# Patient Record
Sex: FEMALE | Race: AMERICAN INDIAN OR ALASKA NATIVE
[De-identification: names, ages, dates, MRNs, and addresses within clinical notes are randomized per-mention and may not be internally consistent; named-entity substitution may affect disease eponyms.]

---

## 2019-03-19 ENCOUNTER — HOSPITAL ENCOUNTER (INPATIENT)
Dept: HOSPITAL 5 - APU | Age: 29
LOS: 2 days | Discharge: HOME | End: 2019-03-21
Attending: OBSTETRICS & GYNECOLOGY | Admitting: OBSTETRICS & GYNECOLOGY
Payer: MEDICAID

## 2019-03-19 DIAGNOSIS — D64.9: ICD-10-CM

## 2019-03-19 DIAGNOSIS — O34.211: Primary | ICD-10-CM

## 2019-03-19 DIAGNOSIS — D62: ICD-10-CM

## 2019-03-19 DIAGNOSIS — Z3A.39: ICD-10-CM

## 2019-03-19 LAB
BASOPHILS # (AUTO): 0 K/MM3 (ref 0–0.1)
BASOPHILS NFR BLD AUTO: 0.5 % (ref 0–1.8)
EOSINOPHIL # BLD AUTO: 0 K/MM3 (ref 0–0.4)
EOSINOPHIL NFR BLD AUTO: 0.9 % (ref 0–4.3)
HCT VFR BLD CALC: 31.9 % (ref 30.3–42.9)
HGB BLD-MCNC: 10.4 GM/DL (ref 10.1–14.3)
LYMPHOCYTES # BLD AUTO: 1.5 K/MM3 (ref 1.2–5.4)
LYMPHOCYTES NFR BLD AUTO: 38.4 % (ref 13.4–35)
MCHC RBC AUTO-ENTMCNC: 33 % (ref 30–34)
MCV RBC AUTO: 80 FL (ref 79–97)
MONOCYTES # (AUTO): 0.3 K/MM3 (ref 0–0.8)
MONOCYTES % (AUTO): 8.9 % (ref 0–7.3)
PLATELET # BLD: 227 K/MM3 (ref 140–440)
RBC # BLD AUTO: 4 M/MM3 (ref 3.65–5.03)

## 2019-03-19 PROCEDURE — 86850 RBC ANTIBODY SCREEN: CPT

## 2019-03-19 PROCEDURE — C1765 ADHESION BARRIER: HCPCS

## 2019-03-19 PROCEDURE — 0UB70ZZ EXCISION OF BILATERAL FALLOPIAN TUBES, OPEN APPROACH: ICD-10-PCS | Performed by: OBSTETRICS & GYNECOLOGY

## 2019-03-19 PROCEDURE — 88302 TISSUE EXAM BY PATHOLOGIST: CPT

## 2019-03-19 PROCEDURE — 86900 BLOOD TYPING SEROLOGIC ABO: CPT

## 2019-03-19 PROCEDURE — 86901 BLOOD TYPING SEROLOGIC RH(D): CPT

## 2019-03-19 PROCEDURE — 85014 HEMATOCRIT: CPT

## 2019-03-19 PROCEDURE — 85018 HEMOGLOBIN: CPT

## 2019-03-19 PROCEDURE — C9250 ARTISS FIBRIN SEALANT: HCPCS

## 2019-03-19 PROCEDURE — 36415 COLL VENOUS BLD VENIPUNCTURE: CPT

## 2019-03-19 PROCEDURE — 85025 COMPLETE CBC W/AUTO DIFF WBC: CPT

## 2019-03-19 RX ADMIN — HYDROMORPHONE HYDROCHLORIDE PRN MG: 1 INJECTION, SOLUTION INTRAMUSCULAR; INTRAVENOUS; SUBCUTANEOUS at 15:44

## 2019-03-19 NOTE — POST ANESTHESIA EVALUATION
- Post Anesthesia Evaluation


Airway Patent: Yes


Stable Respiratory Function: Yes


Nausea/Vomiting: No


Temp > 96.8F: Yes


Pain Manageable: Yes


Adequeate Hydration: Yes


Anesthesia Complications: No


Block Receding Appropriately: Yes


Patient on Ventilator: No

## 2019-03-19 NOTE — HISTORY AND PHYSICAL REPORT
History of Present Illness


Date of admission: 


19 09:15





History of present illness: 








29yo  at 39     / weeks MARIZA presents for scheduled  section.  She

reports good fetal movement, no loss of fluid and no vaginal bleeding.





Her prenatal care has been routine with [facility].  She was 

comanaged with [facility] due to her history of previous  section.  She also 

desires a female sterilization and has been consented for the risk of ectopic 

pregnancy and pregnancy failure. 





Past History





- Obstetrical History


Expected Date of Delivery: 19


Actual Gestation: 39 Week(s) 2 Day(s) 


: 4


Number of Living Children: 3





Medications and Allergies


                                    Allergies











Allergy/AdvReac Type Severity Reaction Status Date / Time


 


No Known Allergies Allergy   Verified 16 08:51











                                Home Medications











 Medication  Instructions  Recorded  Confirmed  Last Taken  Type


 


Ferrous Sulfate [Feosol 325 MG tab] 325 mg PO BID #60 tablet 16  Unknown 

Rx


 


HYDROcodone/APAP 5-325 [[place] 1 each PO Q6HR PRN #30 tablet 16  Unknown Rx





5/325]     


 


Ibuprofen [Motrin] 800 mg PO Q8HR PRN #30 tablet 16  Unknown Rx


 


Prenatal Vit Calc,Iron,Folic 1 each PO DAILY #30 tablet 16  Unknown Rx





[Prenatal Vitamins]     











Active Meds: 


Active Medications





Hydromorphone HCl (Dilaudid)  0.25 mg IV Q5M PRN


   PRN Reason: Breakthrough Pain


   Stop: 19 23:59


Hydromorphone HCl (Dilaudid)  0.5 mg IV ONCE PRN


   PRN Reason: breakthrough pain > 7/10


Cefazolin Sodium (Ancef/Sterile Water 2 Gm/20 Ml)  2 gm in 20 mls @ 80 mls/hr IV

PREOP NR; Protocol


   Stop: 19 23:59


Lactated Ringer's (Lactated Ringers)  1,000 mls @ 2,250 mls/hr IV PREOP PETER


   Stop: 19 13:27


Oxytocin/Sodium Chloride (Pitocin/Ns 20 Unit/1000ml Drip)  20 units in 1,000 mls

@ 0 mls/hr IV TITR PETER


Naloxone HCl (Narcan 0.4 Mg/1 Ml)  0.2 mg IV Q2MIN PRN


   PRN Reason: Res Rate </= 8 or 02 SAT < 92%


Ondansetron HCl (Zofran)  4 mg IV Q8H PRN


   PRN Reason: Nausea And Vomiting


Sodium Chloride (Sodium Chloride Flush Syringe 10 Ml)  10 ml IV PRN NR


   Stop: 19 11:59











- Vital Signs


Vital signs: 


                                   Vital Signs











Temp Pulse Resp BP


 


 98.8 F   73   16   124/68 


 


 19 11:37  19 11:37  19 11:37  19 11:37








                                        











Temp Pulse Resp BP Pulse Ox


 


 98.8 F   73   16   124/68    


 


 19 11:37  19 11:37  19 11:37  19 11:37   














- Obstetrical


FHR: category 1





Results


Result Diagrams: 


                                19 Unknown





                              Abnormal lab results











  19 Range/Units





  Unknown 


 


WBC  3.9 L  (4.5-11.0)  K/mm3


 


MCH  26 L  (28-32)  pg


 


RDW  15.3 H  (13.2-15.2)  %


 


Lymph % (Auto)  38.4 H  (13.4-35.0)  %


 


Mono % (Auto)  8.9 H  (0.0-7.3)  %








All other labs normal.








Assessment and Plan





- Patient Problems


(1) Previous  delivery affecting pregnancy


Onset Date: 16   Current Visit: No   Status: Acute   





(2) 39 weeks gestation of pregnancy


Current Visit: Yes   Status: Acute   


Plan to address problem: 





IVF


Routine labs, T & Screen


SCD


Ancef 2g IV


Risks, benefits and alternatives discussed informed consent signed for bilateral

tubal ligation and  section. 


Proceed to OR. 








(3) Anemia affecting pregnancy


Current Visit: Yes   Status: Acute

## 2019-03-19 NOTE — OPERATIVE REPORT
Operative Report


Operative Report: 











DATE OF OPERATION 3/19/19





PREOP Diagnosis


1. 39 4/7 weeks gestation


2. Previous  section


3. Desires permanent sterilization





PREOP Diagnosis


1. 39 4/7 weeks gestation


2. Previous  section


3. Desires permanent sterilization





Procedure: 


1. Repeat low-transverse  section


2. Bilateral tubal ligation via modified [place] method


3. Lysis of adhesions





Findings


1. Viable male infant in the vertex presentation  weighing 6lb 5oz, 2875g APGARS

8 at 1 min, 9 at 5 min


2. Normal bilateral ovaries and tubes


3. Extensive filmy bladder to lower uterine segment adhesions





Surgeon


1. Caitlin Osuna MD





Anesthesia:


1. Epidural 





I/O:


EBL: 600ml


UOP: 300ml, clear urine


IVF 2100ml LR





Specimens removed:


1. Placenta


2. Right and left tubal segments





Complications: none





Disposition: Patient taken to recovery room in stable condition





INDICATIONS:


The patient is a 24yo  at 39 4/7weeks that presented for scheduled repeat

 section and permanent sterilization.  The patient was consented and the

risks including but not limited to bleeding, infections, injury to surrounding 

organs, potential injury to mother/infant were discussed.  Risk of tubal 

ligation including ectopic pregnancy and pregnancy failure were discussed. All 

questions were answered and informed consent signed.  








PROCEDURE:


The patient was taken to the OR in stable condition.  Adequate anesthesia was 

achieved with epidural anesthesia. A pierson catheter was placed.  She wore SCDs 

for DVT prophylaxis.  And received Ancef for infection prophylaxis. The patient 

was prepped and draped in a sterile fashion and an additional time out was done.

A Pfannestiel incision was made over the previous uterine scar.  The fascia was 

incised and the incision extended laterally.  The  superior and inferior aspect 

of the rectus muscle was dissected off of the fascia.  Entry into the peritoneum

was achieved. The peritoneum was adhered to the lower uterine segment with filmy

adhesions.  The bladder was adhered as well. The Metzenbaum scissors was used to

dissect the peritoneum off the lower uterine segment.     A low-transverse 

incision was made made in the uterus and extended laterally.   Fetal membranes 

were ruptured.  The fetal vertex was brought to the hysterotomy.  The body was 

delivered and the infant was  bulb suctioned.  The cord was clamped x 2, cut and

infant handed off to awaiting pediatrician staff.   The placenta was delivered 

intact and 20 units of IV Pitocin were added to LR fluids.  The uterus was 

cleaned of all clots.  The uterus was repaired with 0-Vicryl in a running, 

locked stitch and an imbricating layer of the same suture was used. The lower 

uterine segment was noted to be thin and several figure of 8's were used to 

maintain hemostasis of the lower uterine segment at the level of the 

hysterotomy.  Tissell's fibrine sealant was placed over the hysterotomy and 

lower uterine segment.  





Attention was then turned to the right fallopian tube which was traced down to 

the fimbriated end.  The tube was grasped with the Phoenix scissors at the 

isthmus 3 cm distal to the cornua.  0-Chromic was used to secure a section of 

the fallopian tube and the tubal ligation was carried out using the modified 

[place] methods.  The same procedure was carried out on the left tube and both 

tubes were sent to pathology for evaluation. 





The rectus was reapproximated with 2-0  Vicryl. Interced was placed anterior to 

rectus muscle. The fascia was closed with 0 Vicryl and the skin was closed with 

4-0 Vicryl.





The patient tolerated the procedure well.  All counts were correct x 3.  Urine 

was noted to be clear at close of case.  I was present and scrubbed for the 

entire procedure.








The patient was taken to the recovery room in stable condition.

## 2019-03-20 LAB
HCT VFR BLD CALC: 26 % (ref 30.3–42.9)
HGB BLD-MCNC: 8.5 GM/DL (ref 10.1–14.3)

## 2019-03-20 RX ADMIN — FERROUS SULFATE TAB 325 MG (65 MG ELEMENTAL FE) SCH MG: 325 (65 FE) TAB at 13:37

## 2019-03-20 RX ADMIN — OXYCODONE AND ACETAMINOPHEN PRN TAB: 5; 325 TABLET ORAL at 19:53

## 2019-03-20 RX ADMIN — IBUPROFEN PRN MG: 800 TABLET, FILM COATED ORAL at 09:06

## 2019-03-20 RX ADMIN — IBUPROFEN PRN MG: 800 TABLET, FILM COATED ORAL at 02:43

## 2019-03-20 RX ADMIN — IBUPROFEN PRN MG: 800 TABLET, FILM COATED ORAL at 19:53

## 2019-03-20 RX ADMIN — OXYCODONE AND ACETAMINOPHEN PRN TAB: 5; 325 TABLET ORAL at 02:42

## 2019-03-20 RX ADMIN — SIMETHICONE PRN MG: 80 TABLET, CHEWABLE ORAL at 13:37

## 2019-03-20 RX ADMIN — OXYCODONE AND ACETAMINOPHEN PRN TAB: 5; 325 TABLET ORAL at 09:06

## 2019-03-20 RX ADMIN — IBUPROFEN PRN MG: 800 TABLET, FILM COATED ORAL at 14:42

## 2019-03-20 RX ADMIN — OXYCODONE AND ACETAMINOPHEN PRN TAB: 5; 325 TABLET ORAL at 14:41

## 2019-03-20 RX ADMIN — HYDROMORPHONE HYDROCHLORIDE PRN MG: 1 INJECTION, SOLUTION INTRAMUSCULAR; INTRAVENOUS; SUBCUTANEOUS at 00:01

## 2019-03-20 RX ADMIN — FERROUS SULFATE TAB 325 MG (65 MG ELEMENTAL FE) SCH MG: 325 (65 FE) TAB at 21:52

## 2019-03-20 NOTE — PROGRESS NOTE
Assessment and Plan


A: POD#1


     Asymptomatic Anemia








P: Follow Routine PostOp Orders


   Infed 100mg IM x 1 dose


   Ferrous Sulfate 325mg PO BID


   Advance Diet with +Flatus


   Encourage increased ambulation








Subjective





- Subjective


Date of service: 19


Patient reports: appetite normal, voiding normally, pain well controlled, 

ambulating normally


: doing well, bottle feeding





Objective





- Vital Signs


Latest vital signs: 


                                   Vital Signs











  Temp Pulse Resp BP BP Pulse Ox


 


 19 07:43  98.2 F  78  18  115/71   99


 


 19 04:00  98.6 F  66  16   102/78 


 


 19 02:43    20   


 


 19 02:42    20   


 


 19 00:24  98.6 F  87  18  126/79   100


 


 19 00:01    20   


 


 19 20:02    18   


 


 19 16:00  98.4 F  75  14  132/75   100


 


 19 15:55   70  14  136/80   100


 


 19 15:44    12   


 


 19 15:40   64  13  121/88   100


 


 19 15:25   63  13  121/80   100


 


 19 15:10   52 L  12  133/84   100


 


 19 15:05   55 L  12  136/78   100


 


 19 15:00  97.6 F  58 L  12  130/78   100


 


 19 11:37  98.8 F  73  16   124/68 








                                Intake and Output











 19





 22:59 06:59 14:59


 


Output Total 350 1400 200


 


Balance -350 -1400 -200


 


Output:   


 


  Urine 350 1400 200


 


    Indwelling Catheter  800 200


 


    Void  600 


 


Other:   


 


  Total, Output Amount  600 200


 


  # Voids   


 


    Indwelling Catheter   1


 


    Void  1 














- Exam


Breasts: Present: normal


Cardiovascular: Present: Regular rate


Lungs: Present: Clear to auscultation, Normal air movement


Abdomen: Present: normal appearance, soft, normal bowel sounds


Uterus: Present: normal, firm, fundal height below umbilicus


Extremities: Present: normal


Incision: Present: normal, dry, dressed





- Labs


Labs: 


                              Abnormal lab results











  19 Range/Units





  Unknown 03:49 


 


WBC  3.9 L   (4.5-11.0)  K/mm3


 


Hgb   8.5 L  (10.1-14.3)  gm/dl


 


Hct   26.0 L  (30.3-42.9)  %


 


MCH  26 L   (28-32)  pg


 


RDW  15.3 H   (13.2-15.2)  %


 


Lymph % (Auto)  38.4 H   (13.4-35.0)  %


 


Mono % (Auto)  8.9 H   (0.0-7.3)  %

## 2019-03-21 VITALS — DIASTOLIC BLOOD PRESSURE: 85 MMHG | SYSTOLIC BLOOD PRESSURE: 126 MMHG

## 2019-03-21 RX ADMIN — OXYCODONE AND ACETAMINOPHEN PRN TAB: 5; 325 TABLET ORAL at 11:11

## 2019-03-21 RX ADMIN — OXYCODONE AND ACETAMINOPHEN PRN TAB: 5; 325 TABLET ORAL at 02:15

## 2019-03-21 RX ADMIN — IBUPROFEN PRN MG: 800 TABLET, FILM COATED ORAL at 11:11

## 2019-03-21 RX ADMIN — FERROUS SULFATE TAB 325 MG (65 MG ELEMENTAL FE) SCH MG: 325 (65 FE) TAB at 11:10

## 2019-03-21 RX ADMIN — SIMETHICONE PRN MG: 80 TABLET, CHEWABLE ORAL at 09:37

## 2019-03-21 RX ADMIN — IBUPROFEN PRN MG: 800 TABLET, FILM COATED ORAL at 02:16

## 2019-03-21 NOTE — PROGRESS NOTE
Assessment and Plan





A: POD#2


    Stable


    Asymptomatic Anemia








P: Follow Routine PostOp Orders


    Ferrous Sulfate 325mg PO BID


    Advance Diet with +Flatus


    Encourage increased ambulation


    Discharge home today





Subjective





- Subjective


Date of service: 19


Principal diagnosis: POD#2 s/p Repeat c/s with BTL


Patient reports: appetite normal, voiding normally, pain well controlled, 

flatus, ambulating normally, no bowel movement


[place]: doing well, bottle feeding





Objective





- Vital Signs


Latest vital signs: 


                                   Vital Signs











  Temp Pulse Resp BP BP Pulse Ox


 


 19 07:49  98.8 F  82  18  117/71   98


 


 19 02:16    20   


 


 19 02:15    20   


 


 19 00:25  98.3 F  79  18   120/67  96


 


 19 19:53    20   


 


 19 17:20  97.6 F  87  18  123/72   100








                                Intake and Output











 19





 23:59 07:59 15:59


 


Intake Total 340 240 


 


Output Total 900  


 


Balance -560 240 


 


Intake:   


 


  Oral 240  


 


  Intake, Free Water 100 240 


 


Output:   


 


  Urine 900  


 


    Void 900  


 


Other:   


 


  Total, Intake Amount 240  


 


  Total, Output Amount 900  


 


  # Voids   


 


    Void 3 2 


 


  Weight  58.967 kg 








                                 Patient Weight











 19





 23:59


 


Weight 58.967 kg














- Exam


Breasts: Present: normal


Cardiovascular: Present: Regular rate, Normal S1, Normal S2, No murmurs


Lungs: Present: Clear to auscultation, Normal air movement


Abdomen: Present: normal appearance, soft, tenderness (as expected Post-op), 

normal bowel sounds.  Absent: distention


Vulva: both: normal


Uterus: Present: firm, fundal height below umbilicus (-1)


Extremities: Present: normal


Deep Tendon Reflex Grade: Normal +2


Incision: Present: normal (LTI, closed with SQ sutures and steri strips. CDI. No

drainage. ), dry, intact

## 2019-03-21 NOTE — DISCHARGE SUMMARY
Providers





- Providers


Date of Admission: 


19 09:15





Date of discharge: 19


Attending physician: 


HEIDI MANRIQUEZ





Primary care physician: 


HEIDI MANRIQUEZ








Hospitalization


Delivery: 


Procedure: bilateral tubal ligation, repeat low transverse


Procedure details: 





See H&P and operative note


Incision: normal (LTI, closed with SQ sutures and steri strips), dry, intact


Other postpartum procedures: tubal ligation


Postpartum complications: none


Discharge diagnosis: IUP at term delivered


 baby: male


Condition at discharge: Good


Disposition: DC-01 TO HOME OR SELFCARE





Plan





- Discharge Medications


Prescriptions: 


Ferrous Sulfate [Feosol 325 MG tab] 325 mg PO BID 30 Days #60 tablet


Ibuprofen 800 mg PO Q6H PRN 10 Days #30 tablet MDD 3200mg


 PRN Reason: Pain, Moderate (4-6)


oxyCODONE /ACETAMINOPHEN [Percocet 5/325] 1 tab PO Q4HR PRN 14 Days #30 tab


 PRN Reason: Pain , Severe (7-10)





- Provider Discharge Summary


Activity: routine, no sex for 6 weeks, no heavy lifting 4 weeks, no strenuous 

exercise


Diet: routine


Instructions: routine


Additional instructions: 


[]  Smoking cessation referral if applicable(refer to patient education folder 

for contact #)


[]  Refer to [facility] Booklet








Call your doctor immediately for:


* Fever > 100.5


* Heavy vaginal bleeding ( >1 pad per hour)


* Severe persistent headache


* Shortness of breath


* Reddened, hot, painful area to leg or breast


* Drainage or odor from incision.





* Keep incision clean and dry at all times and follow doctor's instructions 

regarding bathing/showering











- Follow up plan


Follow up: 


HEIDI MANRIQUEZ MD [Primary Care Provider] - 7 Days


Forms:  C Discharge Summary, [place] DC Identification Form, Discharge 

Signature Page

## 2021-03-10 ENCOUNTER — HOSPITAL ENCOUNTER (EMERGENCY)
Dept: HOSPITAL 5 - ED | Age: 31
Discharge: LEFT BEFORE DISCHARGE | End: 2021-03-10
Payer: MEDICAID

## 2021-03-10 VITALS — DIASTOLIC BLOOD PRESSURE: 75 MMHG | SYSTOLIC BLOOD PRESSURE: 124 MMHG

## 2021-03-10 DIAGNOSIS — R51.9: Primary | ICD-10-CM

## 2021-03-10 DIAGNOSIS — Z53.21: ICD-10-CM
